# Patient Record
Sex: FEMALE | Race: WHITE | NOT HISPANIC OR LATINO
[De-identification: names, ages, dates, MRNs, and addresses within clinical notes are randomized per-mention and may not be internally consistent; named-entity substitution may affect disease eponyms.]

---

## 2017-10-20 ENCOUNTER — TRANSCRIPTION ENCOUNTER (OUTPATIENT)
Age: 22
End: 2017-10-20

## 2019-07-25 ENCOUNTER — EMERGENCY (EMERGENCY)
Facility: HOSPITAL | Age: 24
LOS: 0 days | Discharge: HOME | End: 2019-07-25
Attending: STUDENT IN AN ORGANIZED HEALTH CARE EDUCATION/TRAINING PROGRAM
Payer: COMMERCIAL

## 2019-07-25 ENCOUNTER — TRANSCRIPTION ENCOUNTER (OUTPATIENT)
Age: 24
End: 2019-07-25

## 2019-07-25 VITALS
HEART RATE: 82 BPM | DIASTOLIC BLOOD PRESSURE: 84 MMHG | SYSTOLIC BLOOD PRESSURE: 126 MMHG | OXYGEN SATURATION: 99 % | RESPIRATION RATE: 16 BRPM | TEMPERATURE: 99 F

## 2019-07-25 VITALS — WEIGHT: 195.11 LBS | HEIGHT: 64 IN

## 2019-07-25 DIAGNOSIS — R10.32 LEFT LOWER QUADRANT PAIN: ICD-10-CM

## 2019-07-25 PROCEDURE — 99283 EMERGENCY DEPT VISIT LOW MDM: CPT

## 2019-07-25 RX ORDER — IBUPROFEN 200 MG
600 TABLET ORAL ONCE
Refills: 0 | Status: COMPLETED | OUTPATIENT
Start: 2019-07-25 | End: 2019-07-25

## 2019-07-25 RX ADMIN — Medication 600 MILLIGRAM(S): at 18:40

## 2019-07-25 NOTE — ED STATDOCS - ATTENDING CONTRIBUTION TO CARE
I, Claudine Israel MD,  performed the initial face to face bedside interview with this patient regarding history of present illness, review of symptoms and relevant past medical, social and family history.  I completed an independent physical examination.  I was the initial provider who evaluated this patient. I have signed out the follow up of any pending tests (i.e. labs, radiological studies) to the ACP.  I have communicated the patient’s plan of care and disposition with the ACP.  The history, relevant review of systems, past medical and surgical history, medical decision making, and physical examination was documented by the scribe in my presence and I attest to the accuracy of the documentation.

## 2019-07-25 NOTE — ED STATDOCS - PROGRESS NOTE DETAILS
23 y/o F presents with abd pain since yesterday. Pt reports sharp L sided abd pain when she touches the area or with movement. LMP one month ago. Denies fever/chils, n/v/d, CP, SOB, urinary sx, or other complaints at this time. -Mariusz Be PA-C Low suspicion of intraabdominal pathology, will obtain upreg. Results reviewed and discussed with pt. Discussed importance of close FU with PMD. Pt asked to return to ED immediately for any new or concerning sx or worsening. Pt acknowledges and understands plan -Mariusz Be PA-C

## 2019-07-25 NOTE — ED STATDOCS - NSFOLLOWUPCLINICS_GEN_ALL_ED_FT
[facility]  Family Medicine  [address] [place] [address]  Phone: [phone]  Fax:   Follow Up Time: 1-3 Days

## 2019-07-25 NOTE — ED STATDOCS - CLINICAL SUMMARY MEDICAL DECISION MAKING FREE TEXT BOX
Pt coming in with 2 days of atraumatic left sided abd pain, without any associated sx, do not suspect intraabdominal pathology at this point given exam, will ensure pt is not pregnant, give pain control and DC with close return precautions.
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
## 2019-07-25 NOTE — ED STATDOCS - GASTROINTESTINAL, MLM
abdomen soft, and non-distended. Bowel sounds present. +minimal point TTP in left mid-abd without rebound or guarding

## 2022-02-11 NOTE — ED ADULT TRIAGE NOTE - WEIGHT IN KG
CONSTITUTIONAL:  see HPI  SKIN:  no skin rash  EYES:  no visual changes  ENMT: no neck pain or stiffness  CARD:  no chest pain  RESP:  no cough or respiratory distress  ABD:  + LLQ abd pain/groin pain; no other abdominal pain, nausea, vomiting, or diarrhea  :  no dysuria, frequency or burning  MSK:  no back pain  NEURO:  no headache   Except as documented in the HPI,  all other systems are negative
88.5

## 2024-08-20 NOTE — ED STATDOCS - DISPOSITION TYPE
